# Patient Record
Sex: MALE | Race: BLACK OR AFRICAN AMERICAN | Employment: UNEMPLOYED | URBAN - METROPOLITAN AREA
[De-identification: names, ages, dates, MRNs, and addresses within clinical notes are randomized per-mention and may not be internally consistent; named-entity substitution may affect disease eponyms.]

---

## 2017-01-30 ENCOUNTER — HOSPITAL ENCOUNTER (EMERGENCY)
Facility: CLINIC | Age: 27
Discharge: HOME | End: 2017-01-30
Attending: EMERGENCY MEDICINE | Admitting: EMERGENCY MEDICINE
Payer: COMMERCIAL

## 2017-01-30 VITALS
RESPIRATION RATE: 20 BRPM | TEMPERATURE: 98.4 F | SYSTOLIC BLOOD PRESSURE: 130 MMHG | DIASTOLIC BLOOD PRESSURE: 53 MMHG | OXYGEN SATURATION: 100 %

## 2017-01-30 DIAGNOSIS — T23.201A BURN OF HAND, RIGHT, SECOND DEGREE, INITIAL ENCOUNTER: ICD-10-CM

## 2017-01-30 PROCEDURE — 99283 EMERGENCY DEPT VISIT LOW MDM: CPT | Mod: Z6 | Performed by: EMERGENCY MEDICINE

## 2017-01-30 PROCEDURE — 16020 DRESS/DEBRID P-THICK BURN S: CPT | Mod: RT | Performed by: EMERGENCY MEDICINE

## 2017-01-30 PROCEDURE — 99282 EMERGENCY DEPT VISIT SF MDM: CPT | Mod: 25 | Performed by: EMERGENCY MEDICINE
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
## 2017-01-30 NOTE — DISCHARGE INSTRUCTIONS
First- and Second-Degree Burns  A burn occurs when skin is exposed to too much heat, sun, or harsh chemicals. A first-degree burn (superficial burn) causes only redness, like a sunburn. It heals in a few days. A second-degree burn (partial-thickness burn) is deeper and causes a blister to form. This may take up to 2 weeks to heal.  Home care  Follow these guidelines when caring for yourself at home:    On the first day, you may put a small towel soaked in cool water (cool compress) on the burn to relieve severe pain.    If a bandage was put on, change it once a day, unless you were told otherwise. If the bandage sticks, soak it off under warm running water.    Before changing a bandage, wash your hands. Then wash the area with soap and water to remove any cream, ointment, ooze, or scab. You may do this in a sink, under a tub faucet, or in the shower. Rinse off the soap and pat the area dry with a clean towel. Look for signs of infection listed below.    Put on any prescribed cream or ointment to prevent infection. This also keeps the bandage from sticking.    Cover the burn with a nonstick gauze. Then wrap it with the bandage material.    Change the bandage as soon as you can if it gets wet or dirty.    Use acetaminophen or ibuprofen to control pain, unless another pain medicine was prescribed. If you have chronic liver or kidney disease, talk with your health care provider before using these medicines. Also talk with your provider if you ve had a stomach ulcer or GI bleeding.    Eat more calories and protein until the wound is healed.    Wear a hat, sunscreen, and long sleeves while in the sun to protect the skin.    Don t pick or scratch at the affected areas.    Wear loose-fitting clothing.  Follow-up care  Follow up with your health care provider, or as advised. Most burns heal without becoming infected. Sometimes an infection may occur even with proper treatment. Be sure to check the burn daily for the signs  of infection listed below.  When to seek medical advice  Call your health care provider right away if any of these signs of infection occur:    Pain in the wound gets worse    Redness or swelling gets worse    Pus comes from the wound    Red streaks in your skin come from the burn    Fever of 100.4  F (38  C) or higher, or as directed by your health care provider    Wounds don t appear to be healing    Nausea or vomiting     9074-0888 [employer]. [address] [place] [address], [place] [address]. All rights reserved. This information is not intended as a substitute for professional medical care. Always follow your healthcare professional's instructions.

## 2017-01-30 NOTE — ED AVS SNAPSHOT
[facility], [employer], Emergency Department    [address]    [place] [address]    Phone:  [phone]    Fax:  [phone]                                       Asya Soto   MRN: [id number]    Department:  [facility], Emergency Department   Date of Visit:  1/30/2017           After Visit Summary Signature Page     I have received my discharge instructions, and my questions have been answered. I have discussed any challenges I see with this plan with the nurse or doctor.    ..........................................................................................................................................  Patient/Patient Representative Signature      ..........................................................................................................................................  Patient Representative Print Name and Relationship to Patient    ..................................................               ................................................  Date                                            Time    ..........................................................................................................................................  Reviewed by Signature/Title    ...................................................              ..............................................  Date                                                            Time
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
## 2017-11-21 ENCOUNTER — OFFICE VISIT (OUTPATIENT)
Dept: FAMILY MEDICINE | Facility: CLINIC | Age: 27
End: 2017-11-21

## 2017-11-21 VITALS
WEIGHT: 161.2 LBS | HEART RATE: 61 BPM | DIASTOLIC BLOOD PRESSURE: 66 MMHG | SYSTOLIC BLOOD PRESSURE: 100 MMHG | TEMPERATURE: 99.1 F | HEIGHT: 75 IN | BODY MASS INDEX: 20.04 KG/M2 | OXYGEN SATURATION: 98 % | RESPIRATION RATE: 16 BRPM

## 2017-11-21 DIAGNOSIS — J30.2 CHRONIC SEASONAL ALLERGIC RHINITIS DUE TO OTHER ALLERGEN: ICD-10-CM

## 2017-11-21 DIAGNOSIS — Z23 FLU VACCINE NEED: ICD-10-CM

## 2017-11-21 DIAGNOSIS — Z13.1 SCREENING FOR DIABETES MELLITUS: ICD-10-CM

## 2017-11-21 DIAGNOSIS — Z13.220 LIPID SCREENING: ICD-10-CM

## 2017-11-21 DIAGNOSIS — E55.9 VITAMIN D DEFICIENCY: ICD-10-CM

## 2017-11-21 DIAGNOSIS — Z11.3 SCREEN FOR STD (SEXUALLY TRANSMITTED DISEASE): ICD-10-CM

## 2017-11-21 DIAGNOSIS — Z00.01 ENCOUNTER FOR PREVENTATIVE ADULT HEALTH CARE EXAM WITH ABNORMAL FINDINGS: Primary | ICD-10-CM

## 2017-11-21 PROBLEM — J31.0 CHRONIC RHINITIS: Status: ACTIVE | Noted: 2017-11-21

## 2017-11-21 RX ORDER — OLANZAPINE 10 MG/1
10 TABLET ORAL
COMMUNITY
Start: 2017-02-23

## 2017-11-21 RX ORDER — TRAZODONE HYDROCHLORIDE 100 MG/1
TABLET ORAL
COMMUNITY
Start: 2017-02-23

## 2017-11-21 RX ORDER — IBUPROFEN 200 MG
600 TABLET ORAL
COMMUNITY
Start: 2013-08-24

## 2017-11-21 RX ORDER — MOMETASONE FUROATE MONOHYDRATE 50 UG/1
2 SPRAY, METERED NASAL DAILY
Qty: 1 BOX | Refills: 2 | Status: SHIPPED | OUTPATIENT
Start: 2017-11-21

## 2017-11-21 RX ORDER — OLANZAPINE 5 MG/1
TABLET ORAL
COMMUNITY
Start: 2017-02-23

## 2017-11-21 NOTE — PATIENT INSTRUCTIONS
1. Physical exam  Continue to exercise regularly and eat well balanced meal. Given information on healthy lifestyle and quit plan for smoking cessation. Let us know if you would like to try an additional aids for this.   Lipid and A1c screen secondary to medication, Invega with lab draw (see below to schedule lab draw)  Recommend patient make dental visit and visit for eye exam  Follow with psychiatry  Flu vaccine at [facility] when goes for lab draw.    2. STD screen  Recommend continued condom use, STD screen: HIV, syphilis, chlamydia and gonorrhea. To schedule lab visit at [facility]: [phone]    3. Allergic rhinitis  Started nasal steroid spray: 2 sprays each nostril daily. Will take few weeks to work    4. Vitamin D deficiency history: recheck vitamin D level with labs.    Return to clinic if any other health issues.     Address and phone number for [facility]: you can call this number to schedule lab visit and to set up additional medical visit at [facility]:  [address] (right across from [facility])  Phone [phone]
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
